# Patient Record
Sex: FEMALE | Race: WHITE | NOT HISPANIC OR LATINO | Employment: OTHER | URBAN - METROPOLITAN AREA
[De-identification: names, ages, dates, MRNs, and addresses within clinical notes are randomized per-mention and may not be internally consistent; named-entity substitution may affect disease eponyms.]

---

## 2024-08-06 ENCOUNTER — PATIENT MESSAGE (OUTPATIENT)
Dept: OBSTETRICS AND GYNECOLOGY | Facility: CLINIC | Age: 79
End: 2024-08-06
Payer: MEDICARE

## 2024-08-06 DIAGNOSIS — Z12.31 VISIT FOR SCREENING MAMMOGRAM: Primary | ICD-10-CM

## 2024-08-29 ENCOUNTER — INFUSION (OUTPATIENT)
Dept: INFUSION THERAPY | Facility: HOSPITAL | Age: 79
End: 2024-08-29
Attending: NURSE PRACTITIONER
Payer: MEDICARE

## 2024-08-29 ENCOUNTER — LAB VISIT (OUTPATIENT)
Dept: LAB | Facility: HOSPITAL | Age: 79
End: 2024-08-29
Attending: NURSE PRACTITIONER
Payer: MEDICARE

## 2024-08-29 ENCOUNTER — OFFICE VISIT (OUTPATIENT)
Dept: HEMATOLOGY/ONCOLOGY | Facility: CLINIC | Age: 79
End: 2024-08-29
Payer: MEDICARE

## 2024-08-29 VITALS
TEMPERATURE: 96 F | DIASTOLIC BLOOD PRESSURE: 57 MMHG | OXYGEN SATURATION: 96 % | SYSTOLIC BLOOD PRESSURE: 139 MMHG | HEART RATE: 70 BPM | BODY MASS INDEX: 19.11 KG/M2 | HEIGHT: 61 IN | WEIGHT: 101.19 LBS | RESPIRATION RATE: 18 BRPM

## 2024-08-29 VITALS
RESPIRATION RATE: 18 BRPM | HEART RATE: 70 BPM | TEMPERATURE: 98 F | WEIGHT: 101.19 LBS | SYSTOLIC BLOOD PRESSURE: 139 MMHG | BODY MASS INDEX: 19.12 KG/M2 | DIASTOLIC BLOOD PRESSURE: 57 MMHG

## 2024-08-29 DIAGNOSIS — M81.0 AGE-RELATED OSTEOPOROSIS WITHOUT CURRENT PATHOLOGICAL FRACTURE: Primary | ICD-10-CM

## 2024-08-29 DIAGNOSIS — M81.0 AGE-RELATED OSTEOPOROSIS WITHOUT CURRENT PATHOLOGICAL FRACTURE: ICD-10-CM

## 2024-08-29 LAB
25(OH)D3+25(OH)D2 SERPL-MCNC: 51 NG/ML (ref 30–96)
ANION GAP SERPL CALC-SCNC: 8 MMOL/L (ref 8–16)
BUN SERPL-MCNC: 15 MG/DL (ref 8–23)
CALCIUM SERPL-MCNC: 9.2 MG/DL (ref 8.7–10.5)
CHLORIDE SERPL-SCNC: 107 MMOL/L (ref 95–110)
CO2 SERPL-SCNC: 27 MMOL/L (ref 23–29)
CREAT SERPL-MCNC: 0.7 MG/DL (ref 0.5–1.4)
EST. GFR  (NO RACE VARIABLE): >60 ML/MIN/1.73 M^2
GLUCOSE SERPL-MCNC: 86 MG/DL (ref 70–110)
POTASSIUM SERPL-SCNC: 4.9 MMOL/L (ref 3.5–5.1)
SODIUM SERPL-SCNC: 142 MMOL/L (ref 136–145)

## 2024-08-29 PROCEDURE — 80048 BASIC METABOLIC PNL TOTAL CA: CPT | Mod: PN | Performed by: NURSE PRACTITIONER

## 2024-08-29 PROCEDURE — 3288F FALL RISK ASSESSMENT DOCD: CPT | Mod: CPTII,S$GLB,, | Performed by: NURSE PRACTITIONER

## 2024-08-29 PROCEDURE — 1126F AMNT PAIN NOTED NONE PRSNT: CPT | Mod: CPTII,S$GLB,, | Performed by: NURSE PRACTITIONER

## 2024-08-29 PROCEDURE — 96372 THER/PROPH/DIAG INJ SC/IM: CPT | Mod: PN

## 2024-08-29 PROCEDURE — 82306 VITAMIN D 25 HYDROXY: CPT | Performed by: NURSE PRACTITIONER

## 2024-08-29 PROCEDURE — 99213 OFFICE O/P EST LOW 20 MIN: CPT | Mod: S$GLB,,, | Performed by: NURSE PRACTITIONER

## 2024-08-29 PROCEDURE — 1159F MED LIST DOCD IN RCRD: CPT | Mod: CPTII,S$GLB,, | Performed by: NURSE PRACTITIONER

## 2024-08-29 PROCEDURE — 1160F RVW MEDS BY RX/DR IN RCRD: CPT | Mod: CPTII,S$GLB,, | Performed by: NURSE PRACTITIONER

## 2024-08-29 PROCEDURE — 3078F DIAST BP <80 MM HG: CPT | Mod: CPTII,S$GLB,, | Performed by: NURSE PRACTITIONER

## 2024-08-29 PROCEDURE — 99999 PR PBB SHADOW E&M-EST. PATIENT-LVL IV: CPT | Mod: PBBFAC,,, | Performed by: NURSE PRACTITIONER

## 2024-08-29 PROCEDURE — 63600175 PHARM REV CODE 636 W HCPCS: Mod: JZ,JG,PN | Performed by: NURSE PRACTITIONER

## 2024-08-29 PROCEDURE — 1101F PT FALLS ASSESS-DOCD LE1/YR: CPT | Mod: CPTII,S$GLB,, | Performed by: NURSE PRACTITIONER

## 2024-08-29 PROCEDURE — 36415 COLL VENOUS BLD VENIPUNCTURE: CPT | Mod: PN | Performed by: NURSE PRACTITIONER

## 2024-08-29 PROCEDURE — 3075F SYST BP GE 130 - 139MM HG: CPT | Mod: CPTII,S$GLB,, | Performed by: NURSE PRACTITIONER

## 2024-08-29 RX ADMIN — DENOSUMAB 60 MG: 60 INJECTION SUBCUTANEOUS at 12:08

## 2024-08-29 NOTE — PROGRESS NOTES
Subjective:       Patient ID: Rochelle Yates is a 78 y.o. female.    Chief Complaint: Osteoporosis - Prolia    HPI:  Ms. Yates is a 78 year old white female known to Dr. Garcia for a diagnosis of Osteoporosis (since 2017).    She was previously seen by Dr. Gagnon & having Prolia injections through [facility].    She has now established with our clinic for evaluation prior to Prolia.  Last Prolia was given on 08/30/23.    Today:  08/29/24  Ms. Yates presents to the clinic prior to her next Prolia (#13).  Since last visit, she has been in good health.  Had crown work done 05/02/24; nothing invasive.  No recent surgeries, falls or fractures since her last visit.  She denies any recent invasive dental procedures within the last 3 months, nor plans for the future.    She denies any fevers, chills, cough, cold, congestion, dysuria, etc.   No other new complaints or pertinent findings on a 10 point review of systems.    Past Medical History:   Diagnosis Date    Cataract     H/O exercise stress test 06/17/2024    Asuncion Cui MD    Kidney stone     Osteoporosis     Zeldronic infusion 2/18/15    Reflux     Thyroid disease      Past Surgical History:   Procedure Laterality Date    APPENDECTOMY      AUGMENTATION OF BREAST  2000    BREAST BIOPSY Right 1998    excisional    BREAST SURGERY Bilateral     2000    CARPAL TUNNEL RELEASE      bilateral    CATARACT EXTRACTION W/ INTRAOCULAR LENS IMPLANT Left     COLONOSCOPY  07/22/2021    Dr. Partiad    EYE SURGERY  2020 cataract    HYSTERECTOMY      OOPHORECTOMY      SPINE SURGERY  May 2 2023 ALIF (L4-5-S1)    June 13 2023  Remove 1 screw    TUBAL LIGATION       Current Outpatient Medications on File Prior to Visit   Medication Sig Dispense Refill    ALPRAZolam (XANAX) 0.25 MG tablet alprazolam 0.25 mg tablet  TK 1 T PO BID prn 30 tablet 1    calcium carbonate/vitamin D3 (CALCIUM 600 + D,3, ORAL) Take 1 tablet by mouth once daily. 600 mg calcium and 20 mcg D3       "denosumab (PROLIA) 60 mg/mL Syrg Inject 60 mg into the skin every 6 (six) months.      estradioL (ESTRACE) 0.5 MG tablet TAKE 1 TABLET(0.5 MG) BY MOUTH EVERY DAY 90 tablet 0    levothyroxine (SYNTHROID) 88 MCG tablet Take 1 tablet (88 mcg total) by mouth before breakfast. 90 tablet 2    metoprolol tartrate (LOPRESSOR) 25 MG tablet Take 25 mg by mouth 2 (two) times daily.      pantoprazole (PROTONIX) 40 MG tablet Take 1 tablet (40 mg total) by mouth 2 (two) times daily. 180 tablet 1    rosuvastatin (CRESTOR) 10 MG tablet TAKE 1 TABLET BY MOUTH ONCE  DAILY 90 tablet 3     No current facility-administered medications on file prior to visit.       Review of Systems   Constitutional:  Negative for appetite change and unexpected weight change.   HENT:  Negative for mouth sores.    Eyes:  Negative for visual disturbance.   Respiratory:  Negative for cough and shortness of breath.    Cardiovascular:  Negative for chest pain.   Gastrointestinal:  Negative for abdominal pain and diarrhea.   Genitourinary:  Negative for frequency.   Musculoskeletal:  Negative for back pain.   Skin:  Negative for rash.   Neurological:  Negative for headaches.   Hematological:  Negative for adenopathy.   Psychiatric/Behavioral:  The patient is not nervous/anxious.    All other systems reviewed and are negative.      Objective:        Vitals:    08/29/24 1117   BP: (!) 139/57   BP Location: Left arm   Patient Position: Sitting   BP Method: Small (Automatic)   Pulse: 70   Resp: 18   Temp: 96 °F (35.6 °C)   TempSrc: Temporal   SpO2: 96%   Weight: 45.9 kg (101 lb 3.1 oz)   Height: 5' 1" (1.549 m)     Physical Exam  Vitals reviewed.   Constitutional:       Appearance: She is well-developed.   HENT:      Head: Normocephalic and atraumatic.      Mouth/Throat:      Pharynx: Oropharynx is clear.   Cardiovascular:      Rate and Rhythm: Normal rate and regular rhythm.   Pulmonary:      Effort: Pulmonary effort is normal.      Breath sounds: Normal " breath sounds.   Abdominal:      General: Bowel sounds are normal.      Palpations: Abdomen is soft.   Musculoskeletal:         General: Normal range of motion.      Cervical back: Neck supple.   Lymphadenopathy:      Cervical: No cervical adenopathy.   Skin:     General: Skin is warm and dry.      Capillary Refill: Capillary refill takes less than 2 seconds.   Neurological:      Mental Status: She is alert and oriented to person, place, and time.   Psychiatric:         Behavior: Behavior normal.         Thought Content: Thought content normal.         Judgment: Judgment normal.       BMP  Lab Results   Component Value Date     08/29/2024    K 4.9 08/29/2024     08/29/2024    CO2 27 08/29/2024    BUN 15 08/29/2024    CREATININE 0.7 08/29/2024    CALCIUM 9.2 08/29/2024    ANIONGAP 8 08/29/2024    ESTGFRAFRICA >60 02/23/2022    EGFRNONAA >60 02/23/2022     Vitamin D:   pending    BMD dated 12/27/23:  Osteopenia in left femoral neck ; no significant changes in lumbar/femoral neck densities.    Assessment:       1. Age-related osteoporosis without current pathological fracture           Plan:       1.  Proceed with Prolia 60 mg SQ today.  2.  Call for any need of invasive dental work.  3.  Continue calcium supplementation (600 mg/day) & Vitamin D 1,000 units/day  4.  Follow up in 6 months with interval BMP, Vitamin D prior.    Assessment/plan reviewed and approved by Dr. Tabares.  20  minutes were spent in coordination of patient's care, record review and counseling.  VALERIA Heller, FNP-C  [facility]    Route Chart for Scheduling    Med Onc Chart Routing      Follow up with physician    Follow up with [facility] . F/u in 6 months with BMP, Vitamin D prior; WOULD LIKE AN EARLIER APPT IN 02/25 FOR LABS/OV/Prolia than what is scheduled   Infusion scheduling note    Injection scheduling note Proceed with Prolia today   Labs    Imaging    Pharmacy appointment    Other  referrals              Therapy Plan Information  DENOSUMAB (PROLIA) Q6M for Age-related osteoporosis without current pathological fracture, noted on 2/19/2016  Medications  denosumab (PROLIA) injection 60 mg  60 mg, Subcutaneous, Every 26 weeks      No therapy plan of the specified type found.    No therapy plan of the specified type found.

## 2024-09-18 RX ORDER — ESTRADIOL 0.5 MG/1
TABLET ORAL
Qty: 90 TABLET | Refills: 3 | Status: SHIPPED | OUTPATIENT
Start: 2024-09-18

## 2024-09-19 NOTE — TELEPHONE ENCOUNTER
Refill Decision Note   Rochelle Yates  is requesting a refill authorization.  Brief Assessment and Rationale for Refill:  Approve     Medication Therapy Plan:         Comments:     Note composed:11:45 PM 09/18/2024

## 2025-04-04 NOTE — PROGRESS NOTES
Subjective:       Patient ID: Rochelle Yates is a 79 y.o. female.    Chief Complaint: Osteoporosis - Prolia    HPI:  Ms. Yates is a 79 year old white female known to Dr. Garcia for a diagnosis of Osteoporosis (since 2017).    She was previously seen by Dr. Gagnon & having Prolia injections through [facility].  Past tx:  Reclast.  She has now established with our clinic for evaluation prior to Prolia.  Last Prolia was given on 08/29/24.  PMH: GERD, Thyroid disease, kidney stones, cataracts.    Today:  04/07/25  Ms. Yates presents to the clinic prior to her next Prolia (#14).  Since last visit, she has been in good health.  Hips continue to give her discomfort: receives steroid injections for them.   No recent surgeries, falls or fractures since her last visit. She denies any recent invasive dental procedures within the last 3 months, nor plans for the future.    She denies any fevers, chills, cough, cold, congestion, dysuria, etc.   She reports that her last co-pay for Prolia was $500; may need to change treatment. Will advise.  No other new complaints or pertinent findings on a 10 point review of systems.    Past Medical History:   Diagnosis Date    Cataract     H/O exercise stress test 06/17/2024    Asuncion Cui MD    Kidney stone     Osteoporosis     Zeldronic infusion 2/18/15    Reflux     Thyroid disease      Past Surgical History:   Procedure Laterality Date    APPENDECTOMY      AUGMENTATION OF BREAST  2000    BREAST BIOPSY Right 1998    excisional    BREAST SURGERY Bilateral     2000    CARPAL TUNNEL RELEASE      bilateral    CATARACT EXTRACTION W/ INTRAOCULAR LENS IMPLANT Left     COLONOSCOPY  07/22/2021    Dr. Partida    EYE SURGERY  2020 cataract    HYSTERECTOMY      OOPHORECTOMY      SPINE SURGERY  May 2 2023 ALIF (L4-5-S1)    June 13 2023  Remove 1 screw    TUBAL LIGATION       Current Outpatient Medications on File Prior to Visit   Medication Sig Dispense Refill    ALPRAZolam (XANAX) 0.25 MG tablet  "alprazolam 0.25 mg tablet  TK 1 T PO BID prn 30 tablet 1    calcium carbonate/vitamin D3 (CALCIUM 600 + D,3, ORAL) Take 1 tablet by mouth once daily. 600 mg calcium and 20 mcg D3      denosumab (PROLIA) 60 mg/mL Syrg Inject 60 mg into the skin every 6 (six) months.      estradioL (ESTRACE) 0.5 MG tablet TAKE 1 TABLET(0.5 MG) BY MOUTH EVERY DAY 90 tablet 3    famotidine (PEPCID) 20 MG tablet Take 1 tablet (20 mg total) by mouth once daily. 90 tablet 3    levothyroxine (SYNTHROID) 88 MCG tablet TAKE 1 TABLET BY MOUTH DAILY  BEFORE BREAKFAST 90 tablet 3    metoprolol tartrate (LOPRESSOR) 25 MG tablet Take 25 mg by mouth 2 (two) times daily.      pantoprazole (PROTONIX) 40 MG tablet TAKE 1 TABLET BY MOUTH TWICE  DAILY 180 tablet 2    rosuvastatin (CRESTOR) 10 MG tablet TAKE 1 TABLET BY MOUTH ONCE  DAILY 90 tablet 3     No current facility-administered medications on file prior to visit.       Review of Systems   Constitutional:  Negative for appetite change and unexpected weight change.   HENT:  Negative for mouth sores.    Eyes:  Negative for visual disturbance.   Respiratory:  Negative for cough and shortness of breath.    Cardiovascular:  Negative for chest pain.   Gastrointestinal:  Negative for abdominal pain and diarrhea.   Genitourinary:  Negative for frequency.   Musculoskeletal:  Negative for back pain.   Skin:  Negative for rash.   Neurological:  Negative for headaches.   Hematological:  Negative for adenopathy.   Psychiatric/Behavioral:  The patient is not nervous/anxious.    All other systems reviewed and are negative.      Objective:        Vitals:    04/07/25 0807   BP: (!) 145/69   BP Location: Left arm   Patient Position: Sitting   Pulse: (!) 58   Resp: 16   Temp: 97.1 °F (36.2 °C)   TempSrc: Temporal   SpO2: 95%   Weight: 46.3 kg (102 lb 1.2 oz)   Height: 5' 1" (1.549 m)       Physical Exam  Vitals reviewed.   Constitutional:       Appearance: She is well-developed.   HENT:      Head: Normocephalic and " atraumatic.      Mouth/Throat:      Pharynx: Oropharynx is clear.   Cardiovascular:      Rate and Rhythm: Regular rhythm. Bradycardia present.   Pulmonary:      Effort: Pulmonary effort is normal.      Breath sounds: Normal breath sounds.   Abdominal:      General: Bowel sounds are normal.      Palpations: Abdomen is soft.   Musculoskeletal:         General: Normal range of motion.      Cervical back: Neck supple.   Lymphadenopathy:      Cervical: No cervical adenopathy.   Skin:     General: Skin is warm and dry.      Capillary Refill: Capillary refill takes less than 2 seconds.   Neurological:      Mental Status: She is alert and oriented to person, place, and time.   Psychiatric:         Behavior: Behavior normal.         Thought Content: Thought content normal.         Judgment: Judgment normal.         BMP  Lab Results   Component Value Date     04/07/2025    K 4.8 04/07/2025     04/07/2025    CO2 27 04/07/2025    BUN 14 04/07/2025    CREATININE 0.7 04/07/2025    CALCIUM 9.1 04/07/2025    ANIONGAP 8 04/07/2025    ESTGFRAFRICA >60 02/23/2022    EGFRNONAA >60 02/23/2022     Vitamin D:   pending (last D = 51)    BMD dated 12/27/23:  Osteopenia in left femoral neck ; no significant changes in lumbar/femoral neck densities.    Assessment:       1. Age-related osteoporosis without current pathological fracture           Plan:       1.  Proceed with Prolia 60 mg SQ today.  2.  Call for any need of invasive dental work.  3.  Continue calcium supplementation (600 mg/day) & Vitamin D 1,000 units/day  4.  Follow up in 6 months with interval CMP, Vitamin D prior.  >>To note: expensive co-pay: $500 for last Prolia; may need to change to Reclast; patient will advise.    Assessment/plan reviewed and approved by Dr. Tabares.  28 minutes were spent in coordination of patient's care, record review and counseling.  VALERIA Heller, FNP-C  [facility]    Route Chart for  Scheduling    Med Onc Chart Routing      Follow up with physician    Follow up with DIVYA 6 months. f/u in 6 months with CMP, Vitamin D prior   Infusion scheduling note    Injection scheduling note Prolia today   Labs    Imaging    Pharmacy appointment    Other referrals                  Therapy Plan Information  DENOSUMAB (PROLIA) Q6M for Age-related osteoporosis without current pathological fracture, noted on 2/19/2016  Medications  denosumab (PROLIA) injection 60 mg  60 mg, Subcutaneous, Every 26 weeks      No therapy plan of the specified type found.    No therapy plan of the specified type found.

## 2025-04-07 ENCOUNTER — INFUSION (OUTPATIENT)
Dept: INFUSION THERAPY | Facility: HOSPITAL | Age: 80
End: 2025-04-07
Attending: NURSE PRACTITIONER
Payer: MEDICARE

## 2025-04-07 ENCOUNTER — OFFICE VISIT (OUTPATIENT)
Dept: HEMATOLOGY/ONCOLOGY | Facility: CLINIC | Age: 80
End: 2025-04-07
Payer: MEDICARE

## 2025-04-07 ENCOUNTER — LAB VISIT (OUTPATIENT)
Dept: LAB | Facility: HOSPITAL | Age: 80
End: 2025-04-07
Attending: NURSE PRACTITIONER
Payer: MEDICARE

## 2025-04-07 VITALS
TEMPERATURE: 98 F | BODY MASS INDEX: 19.27 KG/M2 | RESPIRATION RATE: 16 BRPM | HEART RATE: 58 BPM | DIASTOLIC BLOOD PRESSURE: 69 MMHG | HEIGHT: 61 IN | OXYGEN SATURATION: 99 % | SYSTOLIC BLOOD PRESSURE: 145 MMHG | SYSTOLIC BLOOD PRESSURE: 145 MMHG | DIASTOLIC BLOOD PRESSURE: 69 MMHG | OXYGEN SATURATION: 95 % | HEART RATE: 58 BPM | RESPIRATION RATE: 18 BRPM | WEIGHT: 102.06 LBS | TEMPERATURE: 97 F

## 2025-04-07 DIAGNOSIS — M81.0 AGE-RELATED OSTEOPOROSIS WITHOUT CURRENT PATHOLOGICAL FRACTURE: ICD-10-CM

## 2025-04-07 DIAGNOSIS — M81.0 AGE-RELATED OSTEOPOROSIS WITHOUT CURRENT PATHOLOGICAL FRACTURE: Primary | ICD-10-CM

## 2025-04-07 LAB
ANION GAP (OHS): 8 MMOL/L (ref 8–16)
BUN SERPL-MCNC: 14 MG/DL (ref 8–23)
CALCIUM SERPL-MCNC: 9.1 MG/DL (ref 8.7–10.5)
CHLORIDE SERPL-SCNC: 105 MMOL/L (ref 95–110)
CO2 SERPL-SCNC: 27 MMOL/L (ref 23–29)
CREAT SERPL-MCNC: 0.7 MG/DL (ref 0.5–1.4)
GFR SERPLBLD CREATININE-BSD FMLA CKD-EPI: >60 ML/MIN/1.73/M2
GLUCOSE SERPL-MCNC: 71 MG/DL (ref 70–110)
POTASSIUM SERPL-SCNC: 4.8 MMOL/L (ref 3.5–5.1)
SODIUM SERPL-SCNC: 140 MMOL/L (ref 136–145)

## 2025-04-07 PROCEDURE — 63600175 PHARM REV CODE 636 W HCPCS: Mod: JZ,TB,PN | Performed by: NURSE PRACTITIONER

## 2025-04-07 PROCEDURE — 96372 THER/PROPH/DIAG INJ SC/IM: CPT | Mod: PN

## 2025-04-07 PROCEDURE — 1125F AMNT PAIN NOTED PAIN PRSNT: CPT | Mod: CPTII,S$GLB,, | Performed by: NURSE PRACTITIONER

## 2025-04-07 PROCEDURE — 1159F MED LIST DOCD IN RCRD: CPT | Mod: CPTII,S$GLB,, | Performed by: NURSE PRACTITIONER

## 2025-04-07 PROCEDURE — 82306 VITAMIN D 25 HYDROXY: CPT

## 2025-04-07 PROCEDURE — 3288F FALL RISK ASSESSMENT DOCD: CPT | Mod: CPTII,S$GLB,, | Performed by: NURSE PRACTITIONER

## 2025-04-07 PROCEDURE — 3077F SYST BP >= 140 MM HG: CPT | Mod: CPTII,S$GLB,, | Performed by: NURSE PRACTITIONER

## 2025-04-07 PROCEDURE — 1101F PT FALLS ASSESS-DOCD LE1/YR: CPT | Mod: CPTII,S$GLB,, | Performed by: NURSE PRACTITIONER

## 2025-04-07 PROCEDURE — 1160F RVW MEDS BY RX/DR IN RCRD: CPT | Mod: CPTII,S$GLB,, | Performed by: NURSE PRACTITIONER

## 2025-04-07 PROCEDURE — 99999 PR PBB SHADOW E&M-EST. PATIENT-LVL IV: CPT | Mod: PBBFAC,,, | Performed by: NURSE PRACTITIONER

## 2025-04-07 PROCEDURE — 82435 ASSAY OF BLOOD CHLORIDE: CPT | Mod: PN

## 2025-04-07 PROCEDURE — 99213 OFFICE O/P EST LOW 20 MIN: CPT | Mod: S$GLB,,, | Performed by: NURSE PRACTITIONER

## 2025-04-07 PROCEDURE — 36415 COLL VENOUS BLD VENIPUNCTURE: CPT | Mod: PN

## 2025-04-07 PROCEDURE — 3078F DIAST BP <80 MM HG: CPT | Mod: CPTII,S$GLB,, | Performed by: NURSE PRACTITIONER

## 2025-04-07 RX ADMIN — DENOSUMAB 60 MG: 60 INJECTION SUBCUTANEOUS at 08:04

## 2025-04-08 ENCOUNTER — RESULTS FOLLOW-UP (OUTPATIENT)
Dept: HEMATOLOGY/ONCOLOGY | Facility: CLINIC | Age: 80
End: 2025-04-08

## 2025-04-08 LAB — 25(OH)D3+25(OH)D2 SERPL-MCNC: 46 NG/ML (ref 30–96)

## 2025-05-01 ENCOUNTER — PATIENT MESSAGE (OUTPATIENT)
Dept: HEMATOLOGY/ONCOLOGY | Facility: CLINIC | Age: 80
End: 2025-05-01
Payer: MEDICARE

## 2025-06-27 RX ORDER — ESTRADIOL 0.5 MG/1
0.5 TABLET ORAL
Qty: 90 TABLET | Refills: 3 | Status: SHIPPED | OUTPATIENT
Start: 2025-06-27

## 2025-07-16 ENCOUNTER — OFFICE VISIT (OUTPATIENT)
Dept: OBSTETRICS AND GYNECOLOGY | Facility: CLINIC | Age: 80
End: 2025-07-16
Payer: MEDICARE

## 2025-07-16 VITALS — BODY MASS INDEX: 18.7 KG/M2 | DIASTOLIC BLOOD PRESSURE: 78 MMHG | SYSTOLIC BLOOD PRESSURE: 120 MMHG | WEIGHT: 99 LBS

## 2025-07-16 DIAGNOSIS — Z13.9 SCREENING DUE: Primary | ICD-10-CM

## 2025-07-16 DIAGNOSIS — Z12.31 ENCOUNTER FOR SCREENING MAMMOGRAM FOR MALIGNANT NEOPLASM OF BREAST: ICD-10-CM

## 2025-07-16 PROCEDURE — 99999 PR PBB SHADOW E&M-EST. PATIENT-LVL III: CPT | Mod: PBBFAC,,, | Performed by: SPECIALIST

## 2025-07-16 RX ORDER — ESTRADIOL 0.5 MG/1
0.5 TABLET ORAL DAILY
Qty: 90 TABLET | Refills: 3 | Status: SHIPPED | OUTPATIENT
Start: 2025-07-16

## 2025-07-16 RX ORDER — FLUCONAZOLE 200 MG/1
200 TABLET ORAL ONCE
Qty: 1 TABLET | Refills: 6 | Status: SHIPPED | OUTPATIENT
Start: 2025-07-16 | End: 2025-07-16

## 2025-07-16 NOTE — PROGRESS NOTES
80 yo WF, history of hyst and longstanding HRT presents for annual eval  Screening mammogram WNL  Dexa scan due 12/15. Currently on Prolia maintinence  Pt does complain occ dysuria postcoital , relieved with OTC Azo   Past Medical History:   Diagnosis Date    Cataract     H/O exercise stress test 2024    Asuncion Cui MD    Kidney stone     Osteoporosis     Zeldronic infusion 2/18/15    Reflux     Thyroid disease        Past Surgical History:   Procedure Laterality Date    APPENDECTOMY      AUGMENTATION OF BREAST  2000    BREAST BIOPSY Right 1998    excisional    BREAST SURGERY Bilateral         CARPAL TUNNEL RELEASE      bilateral    CATARACT EXTRACTION W/ INTRAOCULAR LENS IMPLANT Left     COLONOSCOPY  2021    Dr. Partida    EYE SURGERY   cataract    HYSTERECTOMY      OOPHORECTOMY      SPINE SURGERY  May 2 2023 ALIF (L4-5-S1)    2023  Remove 1 screw    TUBAL LIGATION         Family History   Problem Relation Name Age of Onset    Pancreatic cancer Mother Galina Hayhron     Cancer Mother Galina Hart          at 62 yrs of age.    Cataracts Father Maximilian Hart     Heart attack Father Maximilian Hart     Alcohol abuse Father Maximilian Hart          at 62 yrs of age    Heart disease Father Maximilian Hart          at 62 yrs of age.    Cataracts Brother Timothy Hayhron     Heart attack Brother Timothy Hart     Heart disease Brother Timothy Hart         78 yrs old    Heart attack Paternal Grandfather      Kidney cancer Maternal Grandmother      Heart disease Maternal Grandfather Trinity Dana     Heart disease Maternal Uncle Alex Dana     Heart disease Paternal Uncle 6 Uncles     Amblyopia Neg Hx      Blindness Neg Hx      Diabetes Neg Hx      Glaucoma Neg Hx      Hypertension Neg Hx      Macular degeneration Neg Hx      Retinal detachment Neg Hx      Strabismus Neg Hx      Stroke Neg Hx      Thyroid disease Neg Hx      Breast cancer Neg Hx      Ovarian cancer Neg Hx          Social History     Socioeconomic History    Marital status:    Tobacco Use    Smoking status: Never    Smokeless tobacco: Never   Substance and Sexual Activity    Alcohol use: Yes     Alcohol/week: 1.0 standard drink of alcohol     Types: 1 Glasses of wine per week    Drug use: Never    Sexual activity: Yes     Partners: Male     Birth control/protection: See Surgical Hx     Comment: Hysterectomy     Social Drivers of Health     Financial Resource Strain: Low Risk  (4/1/2025)    Overall Financial Resource Strain (CARDIA)     Difficulty of Paying Living Expenses: Not hard at all   Food Insecurity: No Food Insecurity (4/1/2025)    Hunger Vital Sign     Worried About Running Out of Food in the Last Year: Never true     Ran Out of Food in the Last Year: Never true   Transportation Needs: No Transportation Needs (4/1/2025)    PRAPARE - Transportation     Lack of Transportation (Medical): No     Lack of Transportation (Non-Medical): No   Physical Activity: Unknown (4/1/2025)    Exercise Vital Sign     Days of Exercise per Week: 2 days   Stress: No Stress Concern Present (4/1/2025)    [employer] [place] [employer] - Occupational Stress Questionnaire     Feeling of Stress : Not at all   Housing Stability: Low Risk  (4/1/2025)    Housing Stability Vital Sign     Unable to Pay for Housing in the Last Year: No     Homeless in the Last Year: No       Current Medications[1]    Review of patient's allergies indicates:   Allergen Reactions    Benzocaine Anaphylaxis and Other (See Comments)    Codeine Nausea And Vomiting    Lidocaine        Review of System:   General: no chills, fever, night sweats, weight gain or weight loss  Psychological: no depression or suicidal ideation  Breasts: no new or changing breast lumps, nipple discharge or masses.  Respiratory: no cough, shortness of breath, or wheezing  Cardiovascular: no chest pain or dyspnea on exertion  Gastrointestinal: no abdominal pain, change in bowel  habits, or black or bloody stools  Genito-Urinary: OCCASSIONAL STRESS URINARY  incontinence, urinary frequency/urgency or vulvar/vaginal symptoms, pelvic pain or abnormal vaginal bleeding.  Musculoskeletal: no gait disturbance or muscular weakness                                               General Appearance    A and O x 4, Cooperative, no distress   Breasts    Abdomen   Symmetrical, no masses, no discharge, skin changes , erythema or retraction. No adenopathy  IMPLANTS BILATAERALLY  Soft, non-tender, bowel sounds active all four quadrants,  no masses, no organomegaly    Genitourinary:   External rectal exam shows no thrombosed external hemorrhoids.   Pelvic exam was performed with patient supine.  No labial fusion.  There is no rash, lesion or injury on the right labia.  There is no rash, lesion or injury on the left labia.  No bleeding and no signs of injury around the vaginal introitus, urethra is without lesions and well supported. The cervix is visualized with no discharge, lesions or friability.  No vaginal discharge found.  No significant Cystocele, Enterocele or rectocele  Bimanual exam:  The urethra is normal to palpation and there are no palpable vaginal wall masses.     Extremities: Extremities normal, atraumatic, no cyanosis or edema                     NOTE  NURSING PERSONAL PRESENT FOR ENTIRE PHYSICAL EXAM     Plan  BSE monthly  Screening mammogram 8/25  Dexa scan 12/15  Daily calcium weight bearing exercise  Prolia tx plan in place  Will continue current ERT  Diflucan on prn basis for symptomatic vaginitis  RTO 1 matt/prn    I spent a total of 30 minutes on the day of the visit. This includes face to face time and non-face to face time preparing to see the patient (eg, review of tests), obtaining and/or reviewing separately obtained history, documenting clinical information in the electronic or other health record, independently interpreting results and communicating results to the  patient/family/caregiver, or care coordinator.            [1]   Current Outpatient Medications   Medication Sig Dispense Refill    ALPRAZolam (XANAX) 0.25 MG tablet alprazolam 0.25 mg tablet  TK 1 T PO BID prn 30 tablet 1    calcium carbonate/vitamin D3 (CALCIUM 600 + D,3, ORAL) Take 1 tablet by mouth once daily. 600 mg calcium and 20 mcg D3      denosumab (PROLIA) 60 mg/mL Syrg Inject 60 mg into the skin every 6 (six) months.      estradioL (ESTRACE) 0.5 MG tablet TAKE 1 TABLET(0.5 MG) BY MOUTH EVERY DAY 90 tablet 3    famotidine (PEPCID) 20 MG tablet Take 1 tablet (20 mg total) by mouth once daily. 90 tablet 3    levothyroxine (SYNTHROID) 88 MCG tablet TAKE 1 TABLET BY MOUTH DAILY  BEFORE BREAKFAST 90 tablet 3    metoprolol tartrate (LOPRESSOR) 25 MG tablet Take 25 mg by mouth 2 (two) times daily.      pantoprazole (PROTONIX) 40 MG tablet TAKE 1 TABLET BY MOUTH TWICE  DAILY 180 tablet 2    rosuvastatin (CRESTOR) 10 MG tablet TAKE 1 TABLET BY MOUTH ONCE  DAILY 90 tablet 3     No current facility-administered medications for this visit.

## 2025-09-03 ENCOUNTER — TELEPHONE (OUTPATIENT)
Facility: CLINIC | Age: 80
End: 2025-09-03
Payer: MEDICARE